# Patient Record
(demographics unavailable — no encounter records)

---

## 2024-10-07 NOTE — ASSESSMENT
[FreeTextEntry1] : TRIPP SINGH is a 67 year old male presents for evaluation.   > PAD - Reviewed studies with patient. ABIs are within normal limits but arterial duplex shows stenosis bilaterally in the tibial vessels.  - Patient without symptoms of critical limb ischemia (rest pain, tissue loss). No intervention is needed at this time. - Recommend medical management with the continued use of aspirin 81mg daily. Continue statin. Continue diabetic and antihypertensive medications. - Counseled patient on decreased use of marijuana smoking.  - Recommended walking regimen of ambulating 30 minutes three times per week.  - Follow up in 6 months for repeat carlyle/pvrs.

## 2024-10-07 NOTE — HISTORY OF PRESENT ILLNESS
[FreeTextEntry1] : TRIPP SINGH is a 67 year old male who presents for evaluation of PAD.   Referred by Dr. Bourgeois.   Patient states that he was evaluated for PAD at his podiatrist's office and was offered a procedure for arterial occlusion in his left leg. He presents today for second opinion.  Patient denies leg pain or wounds on his legs. He reports knee and hip pain before getting muscle cramps when walking. Patient reports decreased exercise.   + PMH: CAD s/p coronary stents 2 years ago, no history of MI, DM, HTN TLD + PSH: Denies + FH: HTN, CAD, HLD + SH: former cigarette smoker (quit 30 years ago), smokes marijuana almost daily, social etoh use, no illicit substance use + Aller: ROBB  PCP is

## 2024-10-07 NOTE — DATA REVIEWED
[FreeTextEntry1] : 10/7/2024 - JESSENIA R JESSENIA 1.46, L JESSENIA 1.4  4/5/2024 - BLE arterial duplex Moderate diffuse atherosclerosis bilateral legs Right PTA and bilateral NEIL with stenosis

## 2024-10-07 NOTE — CONSULT LETTER
[Dear  ___] : Dear  [unfilled], [Consult Letter:] : I had the pleasure of evaluating your patient, [unfilled]. [Please see my note below.] : Please see my note below. [Consult Closing:] : Thank you very much for allowing me to participate in the care of this patient.  If you have any questions, please do not hesitate to contact me. [Sincerely,] : Sincerely, [FreeTextEntry1] : He presented to me for evaluation of PAD. He denies any history of lower extremity nonhealing wounds or rest pain. He denies any symptoms of claudication but his walking is limited by his arthritis. On exam, he has palpable bilateral femoral, popliteal and posterior tibial pulses. His right dorsalis pedis is not palpable and his left dorsalis pedis is weakly palpable. ABIs were performed, which showed no significant arterial disease bilaterally. Prior arterial duplex was reviewed with evidence of tibial stenosis bilaterally.  At this time, as he has no signs or symptoms of critical limb threatening ischemia (rest pain, tissue loss), I am recommending medical management of his symptoms with the continued used of aspirin 81 mg, statin, antihypertensive and diabetic medications. I have counseled him on decreasing his marijuana usage and advised him to walk 30 minutes three times per week. I plan to see him again in six months for follow up.  [FreeTextEntry3] :     Thalia Jack MD, FACS, [facility] Division of Vascular & Endovascular Surgery [facility], Department of Surgery

## 2024-10-07 NOTE — PHYSICAL EXAM
[2+] : left 2+ [0] : right 0 [1+] : left 1+ [Calm] : calm [Ankle Swelling (On Exam)] : not present [Varicose Veins Of Lower Extremities] : not present [] : not present [de-identified] : Well-appearing  [de-identified] : EOMI, anicteric  [de-identified] : soft, nt, nd  [de-identified] : moves all extremities  [de-identified] : no wounds on bilateral feet [de-identified] : A&Ox4

## 2024-10-15 NOTE — PHYSICAL EXAM
[Edema] : no peripheral edema [] : no respiratory distress [Abdomen Soft] : soft [Urethral Meatus] : meatus normal [Urinary Bladder Findings] : the bladder was normal on palpation [Scrotum] : the scrotum was normal [Epididymis] : the epididymides were normal [Testes Tenderness] : no tenderness of the testes [Testes Mass (___cm)] : there were no testicular masses [Normal Station and Gait] : the gait and station were normal for the patient's age [Skin Color & Pigmentation] : normal skin color and pigmentation [No Focal Deficits] : no focal deficits [Oriented To Time, Place, And Person] : oriented to person, place, and time [FreeTextEntry1] : obese [Chaperone Present] : A chaperone was present in the examining room during all aspects of the physical examination [FreeTextEntry2] : gaitree

## 2024-10-15 NOTE — LETTER BODY
[Dear  ___] : Dear  [unfilled], [Consult Letter:] : I had the pleasure of evaluating your patient, [unfilled]. [Please see my note below.] : Please see my note below. [Consult Closing:] : Thank you very much for allowing me to participate in the care of this patient.  If you have any questions, please do not hesitate to contact me. [Sincerely,] : Sincerely, [FreeTextEntry3] : Lobito Thurman MD

## 2024-10-15 NOTE — HISTORY OF PRESENT ILLNESS
[FreeTextEntry1] : 10/15/2024: 67 yrs old male has N x 2-3. No increased frequency in daytime. Flow is good, no burning. Uroflow is good. PVR: 0 ml. NO prostate meds.  O/E: genitalia: normal. PSA: normal  Impression: Nocturia is not secondary to BPH. Pt drinks more in the evening, suggested to cutdown on fluids in the evening. Explained to him that it is not secondary to his "big belly".   RTC: PRN

## 2025-01-09 NOTE — COUNSELING
[Fall prevention counseling provided] : Fall prevention counseling provided [Adequate lighting] : Adequate lighting [No throw rugs] : No throw rugs [Use proper foot wear] : Use proper foot wear [Behavioral health counseling provided] : Behavioral health counseling provided [Engage in a relaxing activity] : Engage in a relaxing activity [Potential consequences of obesity discussed] : Potential consequences of obesity discussed [Benefits of weight loss discussed] : Benefits of weight loss discussed [Structured Weight Management Program suggested:] : Structured weight management program suggested [Weigh Self Weekly] : weigh self weekly [Decrease Portions] : decrease portions [Keep Food Diary] : keep food diary [Good understanding] : Patient has a good understanding of disease, goals and obesity follow-up plan [None] : None

## 2025-01-09 NOTE — PHYSICAL EXAM
Pt is here today for labs. Hua labs peripheral (one stick- right antecubital with a 21g butterfly needle) without complications. Needle removed and pressure dressing applied. Pt to wait for MD visit in [facility] area. Lab visit documented for 8 minutes.     
[Comprehensive Foot Exam Normal] : Right and left foot were examined and both feet are normal. No ulcers in either foot. Toes are normal and with full ROM.  Normal tactile sensation with monofilament testing throughout both feet

## 2025-01-09 NOTE — HEALTH RISK ASSESSMENT
[Very Good] : ~his/her~  mood as very good [2 - 4 times a month (2 pts)] : 2-4 times a month (2 points) [1 or 2 (0 pts)] : 1 or 2 (0 points) [Never (0 pts)] : Never (0 points) [Yes] : In the past 12 months have you used drugs other than those required for medical reasons? Yes [No falls in past year] : Patient reported no falls in the past year [0] : 2) Feeling down, depressed, or hopeless: Not at all (0) [PHQ-2 Negative - No further assessment needed] : PHQ-2 Negative - No further assessment needed [Former] : Former [> 15 Years] : > 15 Years [No Retinopathy] : No retinopathy [Patient reported colonoscopy was abnormal] : Patient reported colonoscopy was abnormal [HIV test declined] : HIV test declined [Hepatitis C test declined] : Hepatitis C test declined [None] : None [With Family] : lives with family [# of Members in Household ___] :  household currently consist of [unfilled] member(s) [Employed] : employed [College] : College [] :  [# Of Children ___] : has [unfilled] children [Sexually Active] : sexually active [Feels Safe at Home] : Feels safe at home [Fully functional (bathing, dressing, toileting, transferring, walking, feeding)] : Fully functional (bathing, dressing, toileting, transferring, walking, feeding) [Smoke Detector] : smoke detector [Carbon Monoxide Detector] : carbon monoxide detector [Safety elements used in home] : safety elements used in home [Seat Belt] :  uses seat belt [Sunscreen] : uses sunscreen [With Patient/Caregiver] : , with patient/caregiver [Name: ___] : Health Care Proxy's Name: [unfilled]  [Relationship: ___] : Relationship: [unfilled] [Aggressive treatment] : aggressive treatment [I will adhere to the patient's wishes.] : I will adhere to the patient's wishes. [Time Spent: ___ minutes] : Time Spent: [unfilled] minutes [Audit-CScore] : 2 [de-identified] : smokes marijuana [de-identified] : not much, walks at times [de-identified] : regular [[id number]] : 0 [EyeExamDate] : 12/2024 [Change in mental status noted] : No change in mental status noted [Reports changes in hearing] : Reports no changes in hearing [Reports changes in vision] : Reports no changes in vision [Reports changes in dental health] : Reports no changes in dental health [Travel to Developing Areas] : does not  travel to developing areas [TB Exposure] : is not being exposed to tuberculosis [ColonoscopyDate] : 2009 [ColonoscopyComments] : h/o diverticulosis, 10/02/2023-Cologuard test done - negative [FreeTextEntry2] :  [AdvancecareDate] : 01/2025

## 2025-01-09 NOTE — HISTORY OF PRESENT ILLNESS
[FreeTextEntry1] :  annual physical [de-identified] : Patient is a 68 year old male with PMH of HTN, HLD, Prediabetes, CAD- s/p PCI in 04/12/2023 , here today for an annual physical. Underwent EST where he developed marked dyspnea with exertion CTA with CAC score 951, suggestion of severe LAD disease S/p cardiac cath with RASHID to mid LAD CTA: CAC score 951, severe mild LAD disease Cath 4/12/23: 90% mLAD s/p RASHID Last Cardiology visit was in 07/2024 Last seen by Derm- 01/2025 Last Opthalmologies- 12/2024 Last Dental- 2024 Scheduled for podiatrist for 01/09/2025 currently on Asa 81mg Plavix 75mg Lipitor 40mg Ramipril 5mg Metformin  mg QD Vit D  plus vit C

## 2025-01-09 NOTE — ASSESSMENT
[FreeTextEntry1] : Recommend to come back for f/u in 3 month Patient agreed and verbalized understanding of above

## 2025-01-29 NOTE — DISCUSSION/SUMMARY
[FreeTextEntry1] : CAD s/p PCI: 90% mid LAD s/p RASHID  Continue asa, off plavix  Cont lipitor 40mg, lipids well controlled, LDL 61 Educated on importance of daily compliance  Palpitations: No recent symptoms, HR improved today. Continue BB.  HTN: BP great, continue Ramipril. Normal crt  HLD: Lipids well controlled, on lipitor given CAD/PAD  preDM: A1C 6.1%, low carb diet, weight loss, now on Ozempic  PAD: Mild to moderate PAD on LE duplex. No symptoms. Medical management, seeing vascular   RV 6M

## 2025-01-29 NOTE — HISTORY OF PRESENT ILLNESS
[FreeTextEntry1] : 68M with HTN, HLD, obesity, CAD s/p PCI for follow up eval  PCP noted his heart to be "racing" at last visit Started pt on Toprol 25mg, tolerating well so far No reported palpitations, HR today at 94 Recently put on Ozempic, no obvious weight loss as of yet  Now on asa monotherapy, denies any CP  Had a LE arterial duplex notable for stenosis in the bilateral tibial vessels, no symptoms- seeing vascular surgery, recommending medical management   HISTORY:  Underwent EST where he developed marked dyspnea with exertion CTA with CAC score 951, suggestion of severe LAD disease S/p cardiac cath with RASHID to mid LAD   Father had an MI at 60. Mother  at 93. Former smoker- quit mid 30s. .   ECG: SR with septal Q waves, PRWP CTA: CAC score 951, severe mild LAD disease  Cath 23: 90% mLAD s/p RASHID  Asa 81mg Lipitor 40mg  Ramipril 5mg Toprol XL 25mg   Metformin 500mg Ozempic 0.25mg qweekly

## 2025-04-09 NOTE — HISTORY OF PRESENT ILLNESS
[FreeTextEntry1] : Patient is here for a follow-up [de-identified] : Patient is a 68-year-old male with PMH of HTN, HLD, Prediabetes, here today for a follow-up visit for his chronic medical conditions. Patient had an angiogram PCI Stent placed on April 12, 2023. Pt reports feeling well today.  Patient presents for follow up for his chronic medical conditions. He reports compliance with all prescribed medical therapy and dietary Patient lost 10 lbs on Ozempic Currently on Currently on  Atorvastatin 40 mg QHS Metformin  mg QD Ramipril 5 mg QD ASA 81 mg QD Multivitamins  Vit D Ozempic 0.5 mg weekly Sopped Plavix in 02/2024 by Cardiologist Last seen by Podiatrist in 02/ 2024- referred to Vascular Surgeon for weak puls- found to PAD, recommend to get Angiogram done( wants to get  2nd opinion),

## 2025-04-09 NOTE — COUNSELING
[Fall prevention counseling provided] : Fall prevention counseling provided [Potential consequences of obesity discussed] : Potential consequences of obesity discussed [Benefits of weight loss discussed] : Benefits of weight loss discussed [Structured Weight Management Program suggested:] : Structured weight management program suggested [Weigh Self Weekly] : weigh self weekly [Decrease Portions] : decrease portions [Keep Food Diary] : keep food diary

## 2025-04-09 NOTE — HEALTH RISK ASSESSMENT
[Yes] : Yes [2 - 3 times a week (3 pts)] : 2 - 3  times a week (3 points) [1 or 2 (0 pts)] : 1 or 2 (0 points) [Never (0 pts)] : Never (0 points) [No] : In the past 12 months have you used drugs other than those required for medical reasons? No [No falls in past year] : Patient reported no falls in the past year [Little interest or pleasure doing things] : 1) Little interest or pleasure doing things [Feeling down, depressed, or hopeless] : 2) Feeling down, depressed, or hopeless [0] : 2) Feeling down, depressed, or hopeless: Not at all (0) [PHQ-2 Negative - No further assessment needed] : PHQ-2 Negative - No further assessment needed [Aggressive treatment] : aggressive treatment [I will adhere to the patient's wishes.] : I will adhere to the patient's wishes. [Time Spent: ___ minutes] : Time Spent: [unfilled] minutes [Former] : Former [> 15 Years] : > 15 Years [de-identified] : Followed-up with cardiologist post PCI  [Audit-CScore] : 3 [de-identified] : Patient endorses marijuana use in the evenings [de-identified] : Patient states he is inactive but is working on it [de-identified] : Well balanced diet [[id number]] : 0 [AdvancecareDate] : 09/20/2023

## 2025-04-09 NOTE — REVIEW OF SYSTEMS
[TextEntry] : Head: Denies headache, dizziness Eyes: No acute vision problem  Ears: Denies hearing loss, tinnitus, no ear pain Nose: Denies nasal obstruction or any discharges Neck: Denies stiffness or muscle tenderness Chest: Denies cough, SOB CV: Denies chest pain, palpitation Abdominal: Denies abdominal pain, change in bowel movement - slow urinary stream, frequent  urination at night Neurology: Denies  changes in mental status, seizure, no neurological deficit

## 2025-04-11 NOTE — PHYSICAL EXAM
[2+] : left 2+ [0] : right 0 [1+] : left 1+ [Calm] : calm [Ankle Swelling (On Exam)] : not present [Varicose Veins Of Lower Extremities] : not present [] : not present [de-identified] : Well-appearing  [de-identified] : EOMI, anicteric  [de-identified] : soft, nt, nd  [de-identified] : moves all extremities  [de-identified] : no wounds on bilateral feet [de-identified] : A&Ox4

## 2025-04-11 NOTE — HISTORY OF PRESENT ILLNESS
[FreeTextEntry1] : TRIPP SINGH is a 67 year old male who presents for evaluation of PAD.   Referred by Dr. Bourgeois.   Patient states that he was evaluated for PAD at his podiatrist's office and was offered a procedure for arterial occlusion in his left leg. He presents today for second opinion.  Patient denies leg pain or wounds on his legs. He reports knee and hip pain before getting muscle cramps when walking. Patient reports decreased exercise.   + PMH: CAD s/p coronary stents 2 years ago, no history of MI, DM, HTN TLD + PSH: Denies + FH: HTN, CAD, HLD + SH: former cigarette smoker (quit 30 years ago), smokes marijuana almost daily, social etoh use, no illicit substance use + Aller: ROBB  PCP is   [de-identified] : 4/7/2025 - Doing well since last visit. Denies leg pain, numbness, or wounds. Continues to take aspirin, statin. Has lost ten pounds w/ ozempic. Has decreased marijuana use.

## 2025-04-11 NOTE — HISTORY OF PRESENT ILLNESS
[FreeTextEntry1] : TRIPP SINGH is a 67 year old male who presents for evaluation of PAD.   Referred by Dr. Bourgeois.   Patient states that he was evaluated for PAD at his podiatrist's office and was offered a procedure for arterial occlusion in his left leg. He presents today for second opinion.  Patient denies leg pain or wounds on his legs. He reports knee and hip pain before getting muscle cramps when walking. Patient reports decreased exercise.   + PMH: CAD s/p coronary stents 2 years ago, no history of MI, DM, HTN TLD + PSH: Denies + FH: HTN, CAD, HLD + SH: former cigarette smoker (quit 30 years ago), smokes marijuana almost daily, social etoh use, no illicit substance use + Aller: ROBB  PCP is   [de-identified] : 4/7/2025 - Doing well since last visit. Denies leg pain, numbness, or wounds. Continues to take aspirin, statin. Has lost ten pounds w/ ozempic. Has decreased marijuana use.

## 2025-04-11 NOTE — ASSESSMENT
[Arterial/Venous Disease] : arterial/venous disease [FreeTextEntry1] : TRIPP SINGH is a 68 year old male presents for evaluation.   > PAD - JESSENIA/PVRs reviewed w/ patient. Likely falsely elevated but waveforms are preserved. Known tibial stenosis on prior duplex.  - Patient without symptoms of critical limb ischemia (rest pain, tissue loss). No intervention is needed at this time. - Recommend medical management with the continued use of aspirin 81mg daily. Continue statin. Continue diabetic and antihypertensive medications. - Counseled patient on decreased use of marijuana smoking.  - Recommended walking regimen of ambulating 30 minutes three times per week.  - Follow up in 6 months for repeat jessenia/pvrs.

## 2025-04-11 NOTE — PHYSICAL EXAM
[2+] : left 2+ [0] : right 0 [1+] : left 1+ [Calm] : calm [Ankle Swelling (On Exam)] : not present [Varicose Veins Of Lower Extremities] : not present [] : not present [de-identified] : Well-appearing  [de-identified] : EOMI, anicteric  [de-identified] : soft, nt, nd  [de-identified] : moves all extremities  [de-identified] : no wounds on bilateral feet [de-identified] : A&Ox4

## 2025-04-11 NOTE — DATA REVIEWED
[FreeTextEntry1] : 4/7/2025 - JESSENIA/PVR R JESSENIA 1.25, L JESSENIA 1.53 Likely falsely elevated.  Waveforms are preserved.   10/7/2024 - JESSENIA R JESSENIA 1.46, L JESSENIA 1.4  4/5/2024 - BLE arterial duplex Moderate diffuse atherosclerosis bilateral legs Right PTA and bilateral NEIL with stenosis

## 2025-07-30 NOTE — HISTORY OF PRESENT ILLNESS
[FreeTextEntry1] : 68M with HTN, HLD, obesity, CAD s/p PCI for follow up eval  Feeling generally well overall Denies shortness of breath, dizziness, lightheadedness, syncope. No further palpitations, maintained on toprol  Now on asa monotherapy, no reported CP  Taking ozempic, down 10-15lbs so far  LE arterial duplex notable for stenosis in the bilateral tibial vessels, no symptoms - under care of vascular    HISTORY:  Underwent EST where he developed marked dyspnea with exertion CTA with CAC score 951, suggestion of severe LAD disease S/p cardiac cath with RASHID to mid LAD   Father had an MI at 60. Mother  at 93. Former smoker- quit mid 30s. .   ECG: SR with septal Q waves TTE 3/2023:  1. The left ventricular cavity is normal in size. The left ventricular wall thickness is normal. The left ventricular systolic function is normal with an ejection fraction of 57 %. There is normal left ventricular diastolic function, with normal filling pressure. There are no regional wall motion abnormalities seen. 2. Normal right ventricular size, with normal wall thickness and normal systolic function. 3. There is a calcified structure near the RV apex that likely represents a prominent/calcified moderator band. 4. The left atrium is normal in size. The right atrial is mildly dilated in size. CTA: CAC score 951, severe mild LAD disease  Cath 23: 90% mLAD s/p RASHID  Asa 81mg Lipitor 40mg  Ramipril 5mg Toprol XL 25mg   Metformin 500mg Ozempic 1mg qweekly

## 2025-07-30 NOTE — DISCUSSION/SUMMARY
[EKG obtained to assist in diagnosis and management of assessed problem(s)] : EKG obtained to assist in diagnosis and management of assessed problem(s) [FreeTextEntry1] : CAD s/p PCI: 90% mid LAD s/p RASHID  Continue asa, off plavix  Cont lipitor 40mg, lipids well controlled, LDL 42  HTN: BP great, continue Ramipril. Normal crt  HLD: Lipids well controlled, on lipitor given CAD/PAD  preDM: A1C 6.0%, low carb diet, weight loss, now on Ozempic  PAD: Mild to moderate PAD on LE duplex. No symptoms. Medical management, seeing vascular   RV 6M